# Patient Record
(demographics unavailable — no encounter records)

---

## 2025-04-28 NOTE — DATA REVIEWED
[de-identified] : CT BRAIN  04/18/2025  without contrast.  There is no acute intracranial hemorrhage, mass effect, shift of the midline structures, herniation, extra-axial fluid collection, or hydrocephalus. Chronic lacunar infarcts are seen within the bilateral basal ganglia, bilateral thalami, and also within the right corona radiata. There is mild diffuse cerebral volume loss with prominence of the sulci, fissures, and cisternal spaces which is normal for the patient's age. There is moderate patchy confluent deep and periventricular white matter hypoattenuation statistically compatible with microvascular changes given calcific atherosclerotic disease of the intracranial arteries. The paranasal sinuses and tympanomastoid cavities are clear. The calvarium is intact. There is evidence of bilateral cataract removal. IMPRESSION: No acute intracranial hemorrhage, mass effect, or shift of the midline structures. Multiple chronic lacunar infarcts and moderate severity chronic white matter microvascular type changes, as discussed. [de-identified] : 04/2025 rEEG normal

## 2025-04-28 NOTE — HISTORY OF PRESENT ILLNESS
[FreeTextEntry1] : Current meds: Lacosamide 100 mg bid AmLODIPine 10 mg qd Atorvastatin 80 mg qd Benzonatate 100 mg tid Brilinta (ticagrelor) 60 mg qd Linzess 145 mcg qd MetFORMIN 500 mg ER qd     *** 04/28/2025 *** RENEE BRUNSON 68M, PMH, HTN, HLD, T2DM and seizures   Hospital Course: Discharge Date 22-Apr-2025 Admission Date 18-Apr-2025 17:36 Reason for Admission seizures Hospital Course  68M, from home, ambulates with cane, PMH seizures, HTN, HLD, T2DM presenting after witnessed seizure at urgent care. In the ED, /80, HR 71, RR 17, T 97.9F, satting 94% on RA. Found in the ED to have 2nd breakthrough seizure and was given ativan IV x1. Patient post ictal at the time of examination. CTH negative. Admitted for breakthrough seizure workup. Patient initially placed on NPO, aspiration & seizure precautions. Neurology was consulted and recommended spot EEG. Patient started on CTX and Azithro after being noted to have URI symptoms but mycoplasma, COVID/flu/RSV, blood cultures all negative. Lactate wnl. UA negative. Abx discontinued. Spot EEG was negative. Suspect lower seizure threshold iso underlying infection. Patient given diet and tolerated PO intake. Continued on home Vimpat 100 IV and transitioned to PO.  Patient continued home meds for HTN, HLD. A1c noted to be 6.7. TSH wnl. Patient will be given tessalon perles upon dc. F/u OP with neurology, PCP in 1 week for further recommendations. Patient is able to ambulate and tolerate diet prior to discharge. Patient is stable for discharge per attending and is advised to follow up with PCP as outpatient. Please refer to patient's complete medical chart with documents for a full hospital course, for this is only a brief summary.   Neurology consulted and recommended spot EEG. Spot EEG was negative. It is  likely your viral infection lowered the seizure threshold. Your pneumonia  work-up was negative and you tested negative for Covid, Flu, RSV. Your blood  cultures and urinalysis was negative. Your CT head showed no brain bleed. You  were continued on your home med lacosamide 100 twice a day.  Please continue  taking LACOSAMIDE 100 TWICE A DAY and follow up with neurologist within 1 week.  Seizure Precautions discussed:  1. No driving for 1 year from date of last seizure as per [place] law.  2. No taking a bath alone or showering with standing water > 2 inches  3. No swimming unsupervised  4. No use of automatic or semi-automatic firearms and no using other firearms  unsupervised  5. No use of heavy machinery unsupervised  6. No cooking over an open flame on the front burners (back burners OK)  7. For additional recommendations please discuss with neurology or PCP as  outpatient.